# Patient Record
Sex: MALE | Race: OTHER
[De-identification: names, ages, dates, MRNs, and addresses within clinical notes are randomized per-mention and may not be internally consistent; named-entity substitution may affect disease eponyms.]

---

## 2020-07-28 ENCOUNTER — HOSPITAL ENCOUNTER (INPATIENT)
Dept: HOSPITAL 15 - ER | Age: 60
LOS: 8 days | Discharge: HOME | DRG: 174 | End: 2020-08-05
Attending: INTERNAL MEDICINE | Admitting: NURSE PRACTITIONER
Payer: COMMERCIAL

## 2020-07-28 VITALS — WEIGHT: 200.84 LBS | HEIGHT: 73 IN | BODY MASS INDEX: 26.62 KG/M2

## 2020-07-28 VITALS — SYSTOLIC BLOOD PRESSURE: 142 MMHG | DIASTOLIC BLOOD PRESSURE: 92 MMHG

## 2020-07-28 DIAGNOSIS — Z82.3: ICD-10-CM

## 2020-07-28 DIAGNOSIS — E43: ICD-10-CM

## 2020-07-28 DIAGNOSIS — Z79.02: ICD-10-CM

## 2020-07-28 DIAGNOSIS — I16.0: ICD-10-CM

## 2020-07-28 DIAGNOSIS — Z20.828: ICD-10-CM

## 2020-07-28 DIAGNOSIS — G62.9: ICD-10-CM

## 2020-07-28 DIAGNOSIS — R18.8: ICD-10-CM

## 2020-07-28 DIAGNOSIS — Z91.19: ICD-10-CM

## 2020-07-28 DIAGNOSIS — I07.1: ICD-10-CM

## 2020-07-28 DIAGNOSIS — E87.5: ICD-10-CM

## 2020-07-28 DIAGNOSIS — Z79.899: ICD-10-CM

## 2020-07-28 DIAGNOSIS — I21.4: Primary | ICD-10-CM

## 2020-07-28 DIAGNOSIS — Z79.01: ICD-10-CM

## 2020-07-28 DIAGNOSIS — I63.9: ICD-10-CM

## 2020-07-28 DIAGNOSIS — I13.0: ICD-10-CM

## 2020-07-28 DIAGNOSIS — E03.9: ICD-10-CM

## 2020-07-28 DIAGNOSIS — N18.9: ICD-10-CM

## 2020-07-28 DIAGNOSIS — J45.909: ICD-10-CM

## 2020-07-28 DIAGNOSIS — Z95.5: ICD-10-CM

## 2020-07-28 DIAGNOSIS — I42.0: ICD-10-CM

## 2020-07-28 DIAGNOSIS — N17.0: ICD-10-CM

## 2020-07-28 DIAGNOSIS — F17.210: ICD-10-CM

## 2020-07-28 DIAGNOSIS — I50.43: ICD-10-CM

## 2020-07-28 DIAGNOSIS — D68.9: ICD-10-CM

## 2020-07-28 DIAGNOSIS — R47.81: ICD-10-CM

## 2020-07-28 DIAGNOSIS — E78.5: ICD-10-CM

## 2020-07-28 DIAGNOSIS — I25.5: ICD-10-CM

## 2020-07-28 DIAGNOSIS — J98.11: ICD-10-CM

## 2020-07-28 LAB
ALBUMIN SERPL-MCNC: 2.7 G/DL (ref 3.4–5)
ALP SERPL-CCNC: 98 U/L (ref 45–117)
ALT SERPL-CCNC: 28 U/L (ref 16–61)
ANION GAP SERPL CALCULATED.3IONS-SCNC: 8 MMOL/L (ref 5–15)
APTT PPP: 27.1 SEC (ref 23.64–32.05)
BILIRUB SERPL-MCNC: 1.1 MG/DL (ref 0.2–1)
BUN SERPL-MCNC: 26 MG/DL (ref 7–18)
BUN/CREAT SERPL: 22.4
CALCIUM SERPL-MCNC: 8.2 MG/DL (ref 8.5–10.1)
CHLORIDE SERPL-SCNC: 109 MMOL/L (ref 98–107)
CO2 SERPL-SCNC: 23 MMOL/L (ref 21–32)
GLUCOSE SERPL-MCNC: 95 MG/DL (ref 74–106)
HCT VFR BLD AUTO: 44 % (ref 41–53)
HGB BLD-MCNC: 13.9 G/DL (ref 13.5–17.5)
INR PPP: 1.34 (ref 0.9–1.15)
MAGNESIUM SERPL-MCNC: 2 MG/DL (ref 1.6–2.6)
MCH RBC QN AUTO: 26.4 PG (ref 28–32)
MCV RBC AUTO: 83.5 FL (ref 80–100)
NRBC BLD QL AUTO: 0 %
POTASSIUM SERPL-SCNC: 4.6 MMOL/L (ref 3.5–5.1)
PROT SERPL-MCNC: 6.5 G/DL (ref 6.4–8.2)
SODIUM SERPL-SCNC: 140 MMOL/L (ref 136–145)

## 2020-07-28 PROCEDURE — 96375 TX/PRO/DX INJ NEW DRUG ADDON: CPT

## 2020-07-28 PROCEDURE — 87040 BLOOD CULTURE FOR BACTERIA: CPT

## 2020-07-28 PROCEDURE — 96374 THER/PROPH/DIAG INJ IV PUSH: CPT

## 2020-07-28 PROCEDURE — 93458 L HRT ARTERY/VENTRICLE ANGIO: CPT

## 2020-07-28 PROCEDURE — 84439 ASSAY OF FREE THYROXINE: CPT

## 2020-07-28 PROCEDURE — 93970 EXTREMITY STUDY: CPT

## 2020-07-28 PROCEDURE — 70551 MRI BRAIN STEM W/O DYE: CPT

## 2020-07-28 PROCEDURE — 71045 X-RAY EXAM CHEST 1 VIEW: CPT

## 2020-07-28 PROCEDURE — 93017 CV STRESS TEST TRACING ONLY: CPT

## 2020-07-28 PROCEDURE — 97163 PT EVAL HIGH COMPLEX 45 MIN: CPT

## 2020-07-28 PROCEDURE — 93886 INTRACRANIAL COMPLETE STUDY: CPT

## 2020-07-28 PROCEDURE — 36415 COLL VENOUS BLD VENIPUNCTURE: CPT

## 2020-07-28 PROCEDURE — 81001 URINALYSIS AUTO W/SCOPE: CPT

## 2020-07-28 PROCEDURE — 84443 ASSAY THYROID STIM HORMONE: CPT

## 2020-07-28 PROCEDURE — 85025 COMPLETE CBC W/AUTO DIFF WBC: CPT

## 2020-07-28 PROCEDURE — 85610 PROTHROMBIN TIME: CPT

## 2020-07-28 PROCEDURE — 94640 AIRWAY INHALATION TREATMENT: CPT

## 2020-07-28 PROCEDURE — 83880 ASSAY OF NATRIURETIC PEPTIDE: CPT

## 2020-07-28 PROCEDURE — 78452 HT MUSCLE IMAGE SPECT MULT: CPT

## 2020-07-28 PROCEDURE — 80061 LIPID PANEL: CPT

## 2020-07-28 PROCEDURE — 80307 DRUG TEST PRSMV CHEM ANLYZR: CPT

## 2020-07-28 PROCEDURE — 80048 BASIC METABOLIC PNL TOTAL CA: CPT

## 2020-07-28 PROCEDURE — 99152 MOD SED SAME PHYS/QHP 5/>YRS: CPT

## 2020-07-28 PROCEDURE — 70450 CT HEAD/BRAIN W/O DYE: CPT

## 2020-07-28 PROCEDURE — 97530 THERAPEUTIC ACTIVITIES: CPT

## 2020-07-28 PROCEDURE — 80053 COMPREHEN METABOLIC PANEL: CPT

## 2020-07-28 PROCEDURE — 96372 THER/PROPH/DIAG INJ SC/IM: CPT

## 2020-07-28 PROCEDURE — 93306 TTE W/DOPPLER COMPLETE: CPT

## 2020-07-28 PROCEDURE — 99153 MOD SED SAME PHYS/QHP EA: CPT

## 2020-07-28 PROCEDURE — 93005 ELECTROCARDIOGRAM TRACING: CPT

## 2020-07-28 PROCEDURE — 84484 ASSAY OF TROPONIN QUANT: CPT

## 2020-07-28 PROCEDURE — 85379 FIBRIN DEGRADATION QUANT: CPT

## 2020-07-28 PROCEDURE — 83036 HEMOGLOBIN GLYCOSYLATED A1C: CPT

## 2020-07-28 PROCEDURE — 85730 THROMBOPLASTIN TIME PARTIAL: CPT

## 2020-07-28 PROCEDURE — 71275 CT ANGIOGRAPHY CHEST: CPT

## 2020-07-28 PROCEDURE — 92928 PRQ TCAT PLMT NTRAC ST 1 LES: CPT

## 2020-07-28 PROCEDURE — 83735 ASSAY OF MAGNESIUM: CPT

## 2020-07-28 RX ADMIN — ATORVASTATIN CALCIUM SCH MG: 20 TABLET, FILM COATED ORAL at 22:41

## 2020-07-28 RX ADMIN — FAMOTIDINE SCH MG: 20 TABLET, FILM COATED ORAL at 22:41

## 2020-07-28 NOTE — NUR
Opening noteTelemetry admit from [facility] admitted to Telemetry unit after SBAR received.  Patient oriented to 
PIEDAD RODRIGUEZ, RN primary RN, unit, room, bed, and unit policies regarding patient 
care and visiting hours. Patient now on continuous telemetry monitoring, tele box #7  and 
telemetry reading on arrival to unit is sinus tachy 104 HR. Patient placed on bedside 
oxygen, weighed by bedscale and encouraged to call if they need something. All questions and 
concerns addressed, patient verbalized understanding.

## 2020-07-29 VITALS — SYSTOLIC BLOOD PRESSURE: 143 MMHG | DIASTOLIC BLOOD PRESSURE: 92 MMHG

## 2020-07-29 VITALS — SYSTOLIC BLOOD PRESSURE: 111 MMHG | DIASTOLIC BLOOD PRESSURE: 70 MMHG

## 2020-07-29 VITALS — SYSTOLIC BLOOD PRESSURE: 97 MMHG | DIASTOLIC BLOOD PRESSURE: 59 MMHG

## 2020-07-29 VITALS — SYSTOLIC BLOOD PRESSURE: 143 MMHG | DIASTOLIC BLOOD PRESSURE: 90 MMHG

## 2020-07-29 VITALS — DIASTOLIC BLOOD PRESSURE: 90 MMHG | SYSTOLIC BLOOD PRESSURE: 136 MMHG

## 2020-07-29 LAB
ANION GAP SERPL CALCULATED.3IONS-SCNC: 8 MMOL/L (ref 5–15)
BUN SERPL-MCNC: 29 MG/DL (ref 7–18)
BUN/CREAT SERPL: 20.7
CALCIUM SERPL-MCNC: 8.5 MG/DL (ref 8.5–10.1)
CHLORIDE SERPL-SCNC: 102 MMOL/L (ref 98–107)
CO2 SERPL-SCNC: 27 MMOL/L (ref 21–32)
GLUCOSE SERPL-MCNC: 155 MG/DL (ref 74–106)
HCT VFR BLD AUTO: 48.9 % (ref 41–53)
HGB BLD-MCNC: 15.7 G/DL (ref 13.5–17.5)
MCH RBC QN AUTO: 27.2 PG (ref 28–32)
MCV RBC AUTO: 84.7 FL (ref 80–100)
NRBC BLD QL AUTO: 0.1 %
POTASSIUM SERPL-SCNC: 4.3 MMOL/L (ref 3.5–5.1)
SODIUM SERPL-SCNC: 137 MMOL/L (ref 136–145)

## 2020-07-29 RX ADMIN — FUROSEMIDE SCH MG: 10 INJECTION, SOLUTION INTRAMUSCULAR; INTRAVENOUS at 18:00

## 2020-07-29 RX ADMIN — FAMOTIDINE SCH MG: 20 TABLET, FILM COATED ORAL at 09:28

## 2020-07-29 RX ADMIN — FUROSEMIDE SCH MG: 10 INJECTION, SOLUTION INTRAMUSCULAR; INTRAVENOUS at 06:20

## 2020-07-29 RX ADMIN — ATORVASTATIN CALCIUM SCH MG: 20 TABLET, FILM COATED ORAL at 22:27

## 2020-07-29 RX ADMIN — METOPROLOL TARTRATE SCH MG: 50 TABLET, FILM COATED ORAL at 10:32

## 2020-07-29 RX ADMIN — SPIRONOLACTONE SCH MG: 25 TABLET, FILM COATED ORAL at 18:26

## 2020-07-29 RX ADMIN — FAMOTIDINE SCH MG: 20 TABLET, FILM COATED ORAL at 22:28

## 2020-07-29 RX ADMIN — METOPROLOL TARTRATE SCH MG: 50 TABLET, FILM COATED ORAL at 22:27

## 2020-07-29 RX ADMIN — Medication SCH ML: at 18:35

## 2020-07-29 RX ADMIN — ENOXAPARIN SODIUM SCH MG: 40 INJECTION SUBCUTANEOUS at 09:30

## 2020-07-29 NOTE — NUR
Closing note



Patient is covid negative, gave sbar report to Yu MAGANA, patient to be transferred to room 
221. Patient and all of his belongings were sent with him. No sob or distress noted, Patient 
verbalized understanding of transfer, no questions at this time.

## 2020-07-29 NOTE — NUR
Opening Shift Note

Assumed care of patient, awake and alert reclining on L side watching television. RN noted 
pt taking deep breaths and widening eyes as if in discomfort or apprehensive. Pt denied pain 
or worry stating, "I am just full... and I didn't even eat it all." No other S/S of 
distress/SOB or pain; pt requiring no O2.  Instructed on POC and to call for assist PRN; pt 
VU. RN will continue to monitor for changes Q1hr and PRN. Bed in low position with HOB in 
semi-Randle's position. Nurse call light within pt's reach.

## 2020-07-29 NOTE — NUR
R breath sounds more increased than L with occ scattered rales. L lower lobe clear and 
diminished with upper lobe clear ant. Pt slowly talking more. Initially confused as to 
location. Pt knew he was in the hospital, but thought it was down the hill. Easily 
reoriented. BS soft and active to rounded soft abd. BLEs edematous with 1-2+ pitting in L 
lower leg and both ankles tight with hyperpigmention bilat.

## 2020-07-29 NOTE — NUR
Received 60 y.o male pt from [facility] after receiving report from IZZY Richey. Pt awake 
and alert and oriented x4. Transfered to  221A via w/c. Camelia activity well. Immediately 
connected to O2 at 3.5 lpm via n/c. Pt able to transfer independently from w/c to bed. Bed 
in low position with HOB in [place]'s. Nurse call light attached to L HOB rail.

## 2020-07-30 VITALS — SYSTOLIC BLOOD PRESSURE: 125 MMHG | DIASTOLIC BLOOD PRESSURE: 75 MMHG

## 2020-07-30 VITALS — SYSTOLIC BLOOD PRESSURE: 124 MMHG | DIASTOLIC BLOOD PRESSURE: 73 MMHG

## 2020-07-30 VITALS — SYSTOLIC BLOOD PRESSURE: 108 MMHG | DIASTOLIC BLOOD PRESSURE: 83 MMHG

## 2020-07-30 VITALS — SYSTOLIC BLOOD PRESSURE: 139 MMHG | DIASTOLIC BLOOD PRESSURE: 94 MMHG

## 2020-07-30 VITALS — SYSTOLIC BLOOD PRESSURE: 138 MMHG | DIASTOLIC BLOOD PRESSURE: 89 MMHG

## 2020-07-30 LAB
ANION GAP SERPL CALCULATED.3IONS-SCNC: 6 MMOL/L (ref 5–15)
BUN SERPL-MCNC: 41 MG/DL (ref 7–18)
BUN/CREAT SERPL: 31.1
CALCIUM SERPL-MCNC: 8.4 MG/DL (ref 8.5–10.1)
CHLORIDE SERPL-SCNC: 105 MMOL/L (ref 98–107)
CO2 SERPL-SCNC: 25 MMOL/L (ref 21–32)
GLUCOSE SERPL-MCNC: 122 MG/DL (ref 74–106)
HCT VFR BLD AUTO: 47.4 % (ref 41–53)
HGB BLD-MCNC: 14.9 G/DL (ref 13.5–17.5)
MCH RBC QN AUTO: 26.7 PG (ref 28–32)
MCV RBC AUTO: 84.8 FL (ref 80–100)
NRBC BLD QL AUTO: 0.1 %
POTASSIUM SERPL-SCNC: 4.8 MMOL/L (ref 3.5–5.1)
SODIUM SERPL-SCNC: 136 MMOL/L (ref 136–145)

## 2020-07-30 RX ADMIN — METOPROLOL TARTRATE SCH MG: 50 TABLET, FILM COATED ORAL at 10:00

## 2020-07-30 RX ADMIN — SPIRONOLACTONE SCH MG: 25 TABLET, FILM COATED ORAL at 18:30

## 2020-07-30 RX ADMIN — FUROSEMIDE SCH MG: 10 INJECTION, SOLUTION INTRAMUSCULAR; INTRAVENOUS at 05:25

## 2020-07-30 RX ADMIN — Medication SCH ML: at 18:30

## 2020-07-30 RX ADMIN — METOPROLOL TARTRATE SCH MG: 50 TABLET, FILM COATED ORAL at 22:19

## 2020-07-30 RX ADMIN — Medication SCH ML: at 08:00

## 2020-07-30 RX ADMIN — ENOXAPARIN SODIUM SCH MG: 40 INJECTION SUBCUTANEOUS at 10:00

## 2020-07-30 RX ADMIN — ATORVASTATIN CALCIUM SCH MG: 20 TABLET, FILM COATED ORAL at 22:20

## 2020-07-30 RX ADMIN — FAMOTIDINE SCH MG: 20 TABLET, FILM COATED ORAL at 22:19

## 2020-07-30 RX ADMIN — FAMOTIDINE SCH MG: 20 TABLET, FILM COATED ORAL at 10:00

## 2020-07-30 RX ADMIN — FUROSEMIDE SCH MG: 10 INJECTION, SOLUTION INTRAMUSCULAR; INTRAVENOUS at 18:20

## 2020-07-30 RX ADMIN — Medication SCH ML: at 12:00

## 2020-07-30 RX ADMIN — SPIRONOLACTONE SCH MG: 25 TABLET, FILM COATED ORAL at 05:25

## 2020-07-30 NOTE — NUR
Called to pt's room by IZZY Farr who stated pt's call light went on and he entered room to 
find pt unable to answer questions. Pt not answering questions for this RN or for fellow RN. 
Unable to state his name or anything else, grunts then resumes sleep. Color dusky. O2 sat 
84; hands cold. Notified IZZY Joe,CN who responded to room as well as other RNs and CNAs. 
. Pt mouth breathing heavily; O2 sat remaining in mid 80s. RT paged to room. Heat 
packs given to pt to hold. This RN had covered pt prev with two blankets neither of which 
were on pt at this time. Pt voided in bed. HOB raised, pt beginning to rouse more with O2 
placed between lips. Raymundo Ely, in to room. This RN supplying staff with pt's hx and 
current dx and meds. Team observed pt who began to smile and able to answer questions with 
increasing thoroughness. RT stated he will look in on pt freq for remainder of shift. JENNIFER and 
House Sup left room. Pt has no drugs among his belonging in his bedside drawers.

## 2020-07-30 NOTE — NUR
Pt walking out of room into hallway; guided back to his room by hospital personnel. This RN 
responded to pt's room asking him where he was going. Pt replied that he was looking for / 
waiting on his grandfather. Informed pt that he is in [facility] and his 
grandfather is not here. Pt surprised. RN gave standby assist at pt returned to his bed; RN 
applied O2 via n/c. O2 sat 86. O2 at 3.5  lpm. Pt dropping off to sleep. /62 HR 88, RR 
16. O2 sat increased after several minutes to 96 as long as pt breathing through nose. RN 
reminding pt twice to do this. Pt reclined in bed on R side and fell asleep. Low light in 
room and BR to help orient pt upon his awakening.

## 2020-07-30 NOTE — NUR
Opening Shift Note

Assuming care of patient at this time. Patient is resting in bed with eyes closed. Patient 
shows no signs or symptoms of distress or shortness of breath. Bed is locked and lowered 
with side rails up x2. Will continue to round hourly and as needed.

## 2020-07-30 NOTE — NUR
22g cath used to start second saline lock on first attempt at R post. forearm. Pt bill. well. 
 After lock inserted and taped, pt having episode of gasping for breaths using mouth totally 
to breathe. This RN coaching him to close his mouth and breathe through his nose. Pt instead 
pushed n/c up against nose and brought breathing into calmer slower state then resumed 
sleep. Will endorse to day RN.

## 2020-07-31 VITALS — SYSTOLIC BLOOD PRESSURE: 113 MMHG | DIASTOLIC BLOOD PRESSURE: 72 MMHG

## 2020-07-31 VITALS — SYSTOLIC BLOOD PRESSURE: 119 MMHG | DIASTOLIC BLOOD PRESSURE: 80 MMHG

## 2020-07-31 VITALS — SYSTOLIC BLOOD PRESSURE: 109 MMHG | DIASTOLIC BLOOD PRESSURE: 71 MMHG

## 2020-07-31 VITALS — SYSTOLIC BLOOD PRESSURE: 124 MMHG | DIASTOLIC BLOOD PRESSURE: 78 MMHG

## 2020-07-31 VITALS — SYSTOLIC BLOOD PRESSURE: 142 MMHG | DIASTOLIC BLOOD PRESSURE: 100 MMHG

## 2020-07-31 LAB
ANION GAP SERPL CALCULATED.3IONS-SCNC: 4 MMOL/L (ref 5–15)
BUN SERPL-MCNC: 40 MG/DL (ref 7–18)
BUN/CREAT SERPL: 31.5
CALCIUM SERPL-MCNC: 8.3 MG/DL (ref 8.5–10.1)
CHLORIDE SERPL-SCNC: 104 MMOL/L (ref 98–107)
CO2 SERPL-SCNC: 31 MMOL/L (ref 21–32)
GLUCOSE SERPL-MCNC: 120 MG/DL (ref 74–106)
HCT VFR BLD AUTO: 47.3 % (ref 41–53)
HGB BLD-MCNC: 15 G/DL (ref 13.5–17.5)
MCH RBC QN AUTO: 26.7 PG (ref 28–32)
MCV RBC AUTO: 84.4 FL (ref 80–100)
NRBC BLD QL AUTO: 0.1 %
POTASSIUM SERPL-SCNC: 4.5 MMOL/L (ref 3.5–5.1)
SODIUM SERPL-SCNC: 139 MMOL/L (ref 136–145)

## 2020-07-31 RX ADMIN — FAMOTIDINE SCH MG: 20 TABLET, FILM COATED ORAL at 10:00

## 2020-07-31 RX ADMIN — FUROSEMIDE SCH MG: 10 INJECTION, SOLUTION INTRAMUSCULAR; INTRAVENOUS at 18:07

## 2020-07-31 RX ADMIN — FAMOTIDINE SCH MG: 20 TABLET, FILM COATED ORAL at 21:32

## 2020-07-31 RX ADMIN — SPIRONOLACTONE SCH MG: 25 TABLET, FILM COATED ORAL at 18:08

## 2020-07-31 RX ADMIN — ENOXAPARIN SODIUM SCH MG: 40 INJECTION SUBCUTANEOUS at 10:00

## 2020-07-31 RX ADMIN — ATORVASTATIN CALCIUM SCH MG: 20 TABLET, FILM COATED ORAL at 21:33

## 2020-07-31 RX ADMIN — METOPROLOL TARTRATE SCH MG: 50 TABLET, FILM COATED ORAL at 11:11

## 2020-07-31 RX ADMIN — SPIRONOLACTONE SCH MG: 25 TABLET, FILM COATED ORAL at 05:46

## 2020-07-31 RX ADMIN — METOPROLOL TARTRATE SCH MG: 50 TABLET, FILM COATED ORAL at 21:36

## 2020-07-31 RX ADMIN — Medication SCH ML: at 18:09

## 2020-07-31 RX ADMIN — APIXABAN SCH MG: 2.5 TABLET, FILM COATED ORAL at 21:32

## 2020-07-31 RX ADMIN — FUROSEMIDE SCH MG: 10 INJECTION, SOLUTION INTRAMUSCULAR; INTRAVENOUS at 05:33

## 2020-07-31 RX ADMIN — Medication SCH ML: at 08:00

## 2020-07-31 NOTE — NUR
Communicated to dr garland of clarification on new order of Eliquis 2.5 mg and MRI results of 
stroke, if its ok to give eliquis. MD Garland advised to continue with order of Eliquis. 
Confirmed with MD Garland its ok to give. 



Also spoke to [facility] if its ok to give Eliquis and notified her of MRI results. 
[facility] said it was ok to give Eliquis. 



will carry out.

## 2020-08-01 VITALS — SYSTOLIC BLOOD PRESSURE: 125 MMHG | DIASTOLIC BLOOD PRESSURE: 68 MMHG

## 2020-08-01 VITALS — DIASTOLIC BLOOD PRESSURE: 76 MMHG | SYSTOLIC BLOOD PRESSURE: 119 MMHG

## 2020-08-01 VITALS — SYSTOLIC BLOOD PRESSURE: 123 MMHG | DIASTOLIC BLOOD PRESSURE: 81 MMHG

## 2020-08-01 VITALS — DIASTOLIC BLOOD PRESSURE: 86 MMHG | SYSTOLIC BLOOD PRESSURE: 121 MMHG

## 2020-08-01 VITALS — SYSTOLIC BLOOD PRESSURE: 112 MMHG | DIASTOLIC BLOOD PRESSURE: 64 MMHG

## 2020-08-01 LAB
ALCOHOL, URINE: < 3 MG/DL (ref 0–10)
AMPHETAMINES UR QL SCN: NEGATIVE
BARBITURATES UR QL SCN: NEGATIVE
BENZODIAZ UR QL SCN: NEGATIVE
BZE UR QL SCN: NEGATIVE
CANNABINOIDS UR QL SCN: NEGATIVE
CHOLEST SERPL-MCNC: 83 MG/DL (ref ?–200)
HDLC SERPL-MCNC: 30 MG/DL (ref 40–59)
OPIATES UR QL SCN: NEGATIVE
PCP UR QL SCN: NEGATIVE
TRIGL SERPL-MCNC: 86 MG/DL (ref ?–150)

## 2020-08-01 RX ADMIN — SPIRONOLACTONE SCH MG: 25 TABLET, FILM COATED ORAL at 18:00

## 2020-08-01 RX ADMIN — APIXABAN SCH MG: 2.5 TABLET, FILM COATED ORAL at 10:25

## 2020-08-01 RX ADMIN — FAMOTIDINE SCH MG: 20 TABLET, FILM COATED ORAL at 21:41

## 2020-08-01 RX ADMIN — FUROSEMIDE SCH MG: 10 INJECTION, SOLUTION INTRAMUSCULAR; INTRAVENOUS at 18:00

## 2020-08-01 RX ADMIN — FAMOTIDINE SCH MG: 20 TABLET, FILM COATED ORAL at 10:26

## 2020-08-01 RX ADMIN — Medication SCH ML: at 08:00

## 2020-08-01 RX ADMIN — METOPROLOL TARTRATE SCH MG: 50 TABLET, FILM COATED ORAL at 10:26

## 2020-08-01 RX ADMIN — LEVOTHYROXINE SODIUM SCH MCG: 25 TABLET ORAL at 06:23

## 2020-08-01 RX ADMIN — ATORVASTATIN CALCIUM SCH MG: 20 TABLET, FILM COATED ORAL at 21:41

## 2020-08-01 RX ADMIN — SPIRONOLACTONE SCH MG: 25 TABLET, FILM COATED ORAL at 05:59

## 2020-08-01 RX ADMIN — Medication SCH ML: at 18:14

## 2020-08-01 RX ADMIN — FUROSEMIDE SCH MG: 10 INJECTION, SOLUTION INTRAMUSCULAR; INTRAVENOUS at 05:59

## 2020-08-01 RX ADMIN — APIXABAN SCH MG: 2.5 TABLET, FILM COATED ORAL at 21:39

## 2020-08-01 RX ADMIN — METOPROLOL TARTRATE SCH MG: 50 TABLET, FILM COATED ORAL at 21:41

## 2020-08-01 NOTE — NUR
Called On Call SS

Spoke with Joaquina regarding patient's social service consult. Per Joaquina, Hans with be 
speaking with patient regarding insurance status.

## 2020-08-01 NOTE — NUR
Nutrition Assessment Notes



Please refer to link for full assessment notes.



Est Energy needs: 6849-9378 kcals (23-25 kcal/kgBW)

Est Protein needs: 114-140 gms/day (1.12-1.37 gm/kgBW)



Will continue to monitor and reassess prn.

-------------------------------------------------------------------------------

Addendum: 08/01/20 at 1558 by Selena Lynch RD

-------------------------------------------------------------------------------

Amended: Links added.

## 2020-08-01 NOTE — NUR
Opening Shift Note

Assumed patient care from NOC RN. 

Patient currently laying on left side, no signs of distress. Respirations even and 
unlabored, patient denies shortness of breath at this time. Patient encouraged to use nasal 
canula, verbalized understanding but is refusing at this time. Safety precautions in place. 
Bed is locked and in lowest position, call light within reach. Will continue to monitor.

-------------------------------------------------------------------------------

Addendum: 08/02/20 at 1530 by FABIO WYNN RN RN

-------------------------------------------------------------------------------

Patient's speech is clear/understandable although some slurring is noted (since 7/30 NOC)

## 2020-08-01 NOTE — NUR
Refused Meds

Patient refusing medications at this time. States he wants to speak with a doctor before 
continuing with treatments. Patient is AOx4, arousable to name and able to follow 
directions. +4 strength of all extremities. Safety precautions in place, patient reoriented 
to call light and encouraged to call as needed. Will continue to monitor.

## 2020-08-01 NOTE — NUR
Patient rounds

Patient currently laying supine in bed, denies pain and shortness of breath at this time. 
Patient is AOx4 and restless, but states he does not need anything at this time. Patient 
reminded on and educated about need for urine sample, patient verbalized understanding. 
Safety precautions in place, respirations even and unlabored, will continue to monitor.

## 2020-08-01 NOTE — NUR
Family

Spoke with patient's family regarding plan of care. Patient states he does not remember what 
happened on 7/30, states MD has not discussed diagnosis with him. Family educated that MD 
will discuss patient's diagnosis with patient/family and that it is outside of RN scope of 
practice to diagnose. Family and patient aware and verbalized understanding, patient/family 
aware of neuro consult that was placed and patient's wife requesting to speak with 
neurologist. Will notify neurologist.

## 2020-08-02 VITALS — DIASTOLIC BLOOD PRESSURE: 79 MMHG | SYSTOLIC BLOOD PRESSURE: 111 MMHG

## 2020-08-02 VITALS — DIASTOLIC BLOOD PRESSURE: 75 MMHG | SYSTOLIC BLOOD PRESSURE: 113 MMHG

## 2020-08-02 VITALS — DIASTOLIC BLOOD PRESSURE: 76 MMHG | SYSTOLIC BLOOD PRESSURE: 103 MMHG

## 2020-08-02 VITALS — DIASTOLIC BLOOD PRESSURE: 84 MMHG | SYSTOLIC BLOOD PRESSURE: 118 MMHG

## 2020-08-02 LAB
ANION GAP SERPL CALCULATED.3IONS-SCNC: 3 MMOL/L (ref 5–15)
BUN SERPL-MCNC: 37 MG/DL (ref 7–18)
BUN/CREAT SERPL: 34.6
CALCIUM SERPL-MCNC: 8.5 MG/DL (ref 8.5–10.1)
CHLORIDE SERPL-SCNC: 105 MMOL/L (ref 98–107)
CO2 SERPL-SCNC: 31 MMOL/L (ref 21–32)
GLUCOSE SERPL-MCNC: 100 MG/DL (ref 74–106)
POTASSIUM SERPL-SCNC: 4.4 MMOL/L (ref 3.5–5.1)
SODIUM SERPL-SCNC: 139 MMOL/L (ref 136–145)

## 2020-08-02 RX ADMIN — ENALAPRIL MALEATE SCH MG: 2.5 TABLET ORAL at 22:17

## 2020-08-02 RX ADMIN — Medication SCH ML: at 17:54

## 2020-08-02 RX ADMIN — SPIRONOLACTONE SCH MG: 25 TABLET, FILM COATED ORAL at 06:17

## 2020-08-02 RX ADMIN — Medication SCH ML: at 09:52

## 2020-08-02 RX ADMIN — FAMOTIDINE SCH MG: 20 TABLET, FILM COATED ORAL at 22:16

## 2020-08-02 RX ADMIN — METOPROLOL TARTRATE SCH MG: 50 TABLET, FILM COATED ORAL at 22:16

## 2020-08-02 RX ADMIN — APIXABAN SCH MG: 2.5 TABLET, FILM COATED ORAL at 09:51

## 2020-08-02 RX ADMIN — APIXABAN SCH MG: 2.5 TABLET, FILM COATED ORAL at 22:15

## 2020-08-02 RX ADMIN — SPIRONOLACTONE SCH MG: 25 TABLET, FILM COATED ORAL at 17:54

## 2020-08-02 RX ADMIN — LEVOTHYROXINE SODIUM SCH MCG: 25 TABLET ORAL at 06:17

## 2020-08-02 RX ADMIN — ATORVASTATIN CALCIUM SCH MG: 20 TABLET, FILM COATED ORAL at 22:15

## 2020-08-02 RX ADMIN — FAMOTIDINE SCH MG: 20 TABLET, FILM COATED ORAL at 09:51

## 2020-08-02 RX ADMIN — FUROSEMIDE SCH MG: 10 INJECTION, SOLUTION INTRAMUSCULAR; INTRAVENOUS at 06:17

## 2020-08-02 RX ADMIN — METOPROLOL TARTRATE SCH MG: 50 TABLET, FILM COATED ORAL at 09:51

## 2020-08-02 NOTE — NUR
opening note



pt AxO to self, place, and situation.  confused to time.  respirations even and nonlabored 
on 2L nc.  pt denies pain or discomfort at this time, will continue to monitor.  pt is 
ambulatory.  POC discussed, pt verbalized understanding .  bed in low locked position, call 
light within reach.

## 2020-08-02 NOTE — NUR
MD at Bedside

Dr. Barrios at bedside discussing plan of care with patient. Dr. Barrios re-explained to patient 
that he had a stroke and that we are pending possible angiogram. Patient verbalized 
understanding. MD informed patient and this RN that she had spoke with his next of kin 
regarding patient status and plan of care. 

Patient shows no signs of distress at this time, respirations even and unlabored. Patient is 
AOx4, although speech continues to be slurred but clear/understandable. Will continue to 
monitor.

## 2020-08-02 NOTE — NUR
MD at Station

Spoke with Dr. Garland regarding possible left heart cath tomorrow. Per MD, no intervention 
until patient is stable, no plan for left heart cath tomorrow at this time.

## 2020-08-02 NOTE — NUR
Up with PT

Patient ambulated around station with physical therapist. No sign of distress at this time.

## 2020-08-02 NOTE — NUR
Opening Shift Note

Assumed patient care from [facility] RN, Nydia. 

Patient currently laying on right side. Answers questions appropriately. Patient is AOx4 but 
has periods of forgetfulness, RN asked patient if he remembered speaking with Dr. Melendez, 
patient states he remembers speaking with MD but states he does not recall content of 
conversation. Patient call light within reach, patient encouraged to call PRN. Safety 
precautions in place, will continue to monitor.

## 2020-08-03 VITALS — SYSTOLIC BLOOD PRESSURE: 121 MMHG | DIASTOLIC BLOOD PRESSURE: 73 MMHG

## 2020-08-03 VITALS — SYSTOLIC BLOOD PRESSURE: 127 MMHG | DIASTOLIC BLOOD PRESSURE: 69 MMHG

## 2020-08-03 VITALS — SYSTOLIC BLOOD PRESSURE: 113 MMHG | DIASTOLIC BLOOD PRESSURE: 71 MMHG

## 2020-08-03 VITALS — SYSTOLIC BLOOD PRESSURE: 116 MMHG | DIASTOLIC BLOOD PRESSURE: 78 MMHG

## 2020-08-03 VITALS — SYSTOLIC BLOOD PRESSURE: 99 MMHG | DIASTOLIC BLOOD PRESSURE: 64 MMHG

## 2020-08-03 RX ADMIN — ENALAPRIL MALEATE SCH MG: 2.5 TABLET ORAL at 22:00

## 2020-08-03 RX ADMIN — FAMOTIDINE SCH MG: 20 TABLET, FILM COATED ORAL at 22:00

## 2020-08-03 RX ADMIN — APIXABAN SCH MG: 2.5 TABLET, FILM COATED ORAL at 21:59

## 2020-08-03 RX ADMIN — FUROSEMIDE SCH MG: 10 INJECTION, SOLUTION INTRAMUSCULAR; INTRAVENOUS at 09:58

## 2020-08-03 RX ADMIN — ATORVASTATIN CALCIUM SCH MG: 20 TABLET, FILM COATED ORAL at 21:59

## 2020-08-03 RX ADMIN — SPIRONOLACTONE SCH MG: 25 TABLET, FILM COATED ORAL at 17:50

## 2020-08-03 RX ADMIN — Medication SCH ML: at 17:51

## 2020-08-03 RX ADMIN — FAMOTIDINE SCH MG: 20 TABLET, FILM COATED ORAL at 09:59

## 2020-08-03 RX ADMIN — METOPROLOL TARTRATE SCH MG: 50 TABLET, FILM COATED ORAL at 21:59

## 2020-08-03 RX ADMIN — LEVOTHYROXINE SODIUM SCH MCG: 25 TABLET ORAL at 05:59

## 2020-08-03 RX ADMIN — Medication SCH ML: at 11:33

## 2020-08-03 RX ADMIN — METOPROLOL TARTRATE SCH MG: 50 TABLET, FILM COATED ORAL at 09:59

## 2020-08-03 RX ADMIN — ENALAPRIL MALEATE SCH MG: 2.5 TABLET ORAL at 09:59

## 2020-08-03 RX ADMIN — APIXABAN SCH MG: 2.5 TABLET, FILM COATED ORAL at 09:59

## 2020-08-03 RX ADMIN — SPIRONOLACTONE SCH MG: 25 TABLET, FILM COATED ORAL at 05:58

## 2020-08-03 NOTE — NUR
closing note



pt resting in left lateral position with eyes closed.  respirations even and nonlabored on 
2Lnc.  no s/s of pain or discomfort.  endorsed care to day shift RN Shy.  bed in low 
locked position, call light within reach.

## 2020-08-03 NOTE — NUR
Assumed patient care

Patient currently resting with eyes closed, laying on left side. Respirations even and 
unlabored, no signs of distress or pain at this time. Safety precautions in place. 
Instructed on POC and to call for assist PRN, will continue to monitor.

## 2020-08-03 NOTE — NUR
Opening Shift Note

Assumed patient care from NOC RN.

Patient currently resting with eyes closed, laying on right side. Respirations even and 
unlabored, no signs of distress at this time. Safety precautions in place. Will continue to 
monitor.

## 2020-08-03 NOTE — NUR
Informed Consent

Patient unable to sign informed consent at this time; requesting to speak with Dr. Garland 
regarding procedure/questions. 

Patient AOx4, no signs of distress at this time, respirations even and unlabored. Will 
continue to monitor.

## 2020-08-03 NOTE — NUR
Attempted PT treatment, pt requested PM treatment due to fatigue. Will attempt again this 
afternoon.

## 2020-08-03 NOTE — NUR
assessment  re: ss consult no insurance

Patient is a 60 year old male who is alert and oriented. Prior to admission patient lived 
home with friends and functioned independently. Patient informed me he is able to care for 
his own ADLs.  Per patient he will return home to his prior living arrangements post 
discharge and will have transport home. Patient has no insurance. Patient has been assessed 
by Hans Ernst of [facility]. Per Hans patients Medi-kwadwo is pending. I have provided 
patient with resources for [facility], Dr. Russell, and [facility] urgent care for follow up 
visits. I have provided patient with a prescription card from community assistance program. 
I informed patient his post discharge needs to be determined prior to discharge. I informed 
patient he has a right to speak to a  regarding all care. I informed patient he 
has a right to participate in any and all discharge planning.  Patient does not have a POA 
and advanced directive. I have offered patient information on POA and advanced directives. I 
informed the patient the advantages and benefits of having an Advanced Directive. Patient 
verbalized understanding and agreed to discharge plan.

-------------------------------------------------------------------------------

Addendum: 08/03/20 at 1430 by Joaquina SEPULVEDA

-------------------------------------------------------------------------------

Amended: Links added.

## 2020-08-04 VITALS — SYSTOLIC BLOOD PRESSURE: 119 MMHG | DIASTOLIC BLOOD PRESSURE: 78 MMHG

## 2020-08-04 VITALS — DIASTOLIC BLOOD PRESSURE: 95 MMHG | SYSTOLIC BLOOD PRESSURE: 139 MMHG

## 2020-08-04 VITALS — DIASTOLIC BLOOD PRESSURE: 88 MMHG | SYSTOLIC BLOOD PRESSURE: 126 MMHG

## 2020-08-04 VITALS — DIASTOLIC BLOOD PRESSURE: 77 MMHG | SYSTOLIC BLOOD PRESSURE: 110 MMHG

## 2020-08-04 VITALS — SYSTOLIC BLOOD PRESSURE: 95 MMHG | DIASTOLIC BLOOD PRESSURE: 66 MMHG

## 2020-08-04 LAB
ANION GAP SERPL CALCULATED.3IONS-SCNC: 0 MMOL/L (ref 5–15)
BUN SERPL-MCNC: 36 MG/DL (ref 7–18)
BUN/CREAT SERPL: 27.9
CALCIUM SERPL-MCNC: 8.5 MG/DL (ref 8.5–10.1)
CHLORIDE SERPL-SCNC: 101 MMOL/L (ref 98–107)
CO2 SERPL-SCNC: 36 MMOL/L (ref 21–32)
GLUCOSE SERPL-MCNC: 91 MG/DL (ref 74–106)
HCT VFR BLD AUTO: 45.1 % (ref 41–53)
HGB BLD-MCNC: 14.9 G/DL (ref 13.5–17.5)
INR PPP: 1.21 (ref 0.9–1.15)
MCH RBC QN AUTO: 27.3 PG (ref 28–32)
MCV RBC AUTO: 82.8 FL (ref 80–100)
NRBC BLD QL AUTO: 0.2 %
POTASSIUM SERPL-SCNC: 5.2 MMOL/L (ref 3.5–5.1)
SODIUM SERPL-SCNC: 137 MMOL/L (ref 136–145)

## 2020-08-04 PROCEDURE — B211YZZ FLUOROSCOPY OF MULTIPLE CORONARY ARTERIES USING OTHER CONTRAST: ICD-10-PCS | Performed by: INTERNAL MEDICINE

## 2020-08-04 PROCEDURE — 02703DZ DILATION OF CORONARY ARTERY, ONE ARTERY WITH INTRALUMINAL DEVICE, PERCUTANEOUS APPROACH: ICD-10-PCS | Performed by: INTERNAL MEDICINE

## 2020-08-04 PROCEDURE — 4A023N7 MEASUREMENT OF CARDIAC SAMPLING AND PRESSURE, LEFT HEART, PERCUTANEOUS APPROACH: ICD-10-PCS | Performed by: INTERNAL MEDICINE

## 2020-08-04 PROCEDURE — B215YZZ FLUOROSCOPY OF LEFT HEART USING OTHER CONTRAST: ICD-10-PCS | Performed by: INTERNAL MEDICINE

## 2020-08-04 RX ADMIN — Medication SCH ML: at 08:00

## 2020-08-04 RX ADMIN — APIXABAN SCH MG: 2.5 TABLET, FILM COATED ORAL at 09:33

## 2020-08-04 RX ADMIN — Medication SCH ML: at 18:03

## 2020-08-04 RX ADMIN — ATORVASTATIN CALCIUM SCH MG: 20 TABLET, FILM COATED ORAL at 22:45

## 2020-08-04 RX ADMIN — SPIRONOLACTONE SCH MG: 25 TABLET, FILM COATED ORAL at 05:58

## 2020-08-04 RX ADMIN — APIXABAN SCH MG: 2.5 TABLET, FILM COATED ORAL at 22:45

## 2020-08-04 RX ADMIN — METOPROLOL TARTRATE SCH MG: 50 TABLET, FILM COATED ORAL at 22:00

## 2020-08-04 RX ADMIN — FUROSEMIDE SCH MG: 10 INJECTION, SOLUTION INTRAMUSCULAR; INTRAVENOUS at 09:42

## 2020-08-04 RX ADMIN — METOPROLOL TARTRATE SCH MG: 50 TABLET, FILM COATED ORAL at 10:00

## 2020-08-04 RX ADMIN — FAMOTIDINE SCH MG: 20 TABLET, FILM COATED ORAL at 09:43

## 2020-08-04 RX ADMIN — FAMOTIDINE SCH MG: 20 TABLET, FILM COATED ORAL at 22:45

## 2020-08-04 RX ADMIN — LEVOTHYROXINE SODIUM SCH MCG: 25 TABLET ORAL at 05:59

## 2020-08-04 NOTE — NUR
Opening Note

Assumed care of patient, awake and alert, patient denies distress/SOB or pain. O2 at 2LPM 
via nasal canula. Instructed on POC and to call for assist PRN, will continue to monitor.

## 2020-08-04 NOTE — NUR
Nutrition Followup Note 



Wt 101.4kg



Pt was sleeping with no family at bedside at time of rounds.  Pt with NPO diet order for 
LHC, pt with fair po intake prior to NPO diet order with 61% avg po intake x 2 days per RN 
note. Will monitor pt po intake following LHC.  



Please refer to link for full assessment notes.



Est Energy needs: 3646-8755 kcals (23-25 kcal/kgBW)

Est Protein needs: 114-140 gms/day (1.12-1.37 gm/kgBW)



Will continue to monitor and reassess prn.



Labs:  BUN 36H, Alb 2.2L



BM:  pt with 1 Bm 7/31 per RN note



Skin:  Bs 18 mod risk, full details in RN wound care note



PES:  1) Overweight r/t energy intake in excess of energy needs aeb 122% IBW and BMI of 29.7 
kg/m2

2) Altered nutrition related lab values r/t current medical condition aeb hyperglycemia, 
hypocalcemia, mod hypoalbuminemia



Comments :Will continue to monitor PO status, skin status, pertinent labs and weight trends. 
Will f/u in 3-5 days.



1) Continue to closely monitor pt PO intake to meet at least 75% of meals 2) Refer pt to OP 
RD for nutrition/wt management education upon D/C 3) Continue current plan of care



Expected Outcomes/Goals: 

Pt to see a RD for nutrition/wt management nutrition education after D/C

Pt appetite to improve

Pt labs to improve

## 2020-08-05 VITALS — SYSTOLIC BLOOD PRESSURE: 113 MMHG | DIASTOLIC BLOOD PRESSURE: 65 MMHG

## 2020-08-05 VITALS — DIASTOLIC BLOOD PRESSURE: 62 MMHG | SYSTOLIC BLOOD PRESSURE: 116 MMHG

## 2020-08-05 VITALS — DIASTOLIC BLOOD PRESSURE: 89 MMHG | SYSTOLIC BLOOD PRESSURE: 123 MMHG

## 2020-08-05 VITALS — DIASTOLIC BLOOD PRESSURE: 65 MMHG | SYSTOLIC BLOOD PRESSURE: 113 MMHG

## 2020-08-05 LAB
ANION GAP SERPL CALCULATED.3IONS-SCNC: 5 MMOL/L (ref 5–15)
BUN SERPL-MCNC: 31 MG/DL (ref 7–18)
BUN/CREAT SERPL: 23.8
CALCIUM SERPL-MCNC: 8.2 MG/DL (ref 8.5–10.1)
CHLORIDE SERPL-SCNC: 100 MMOL/L (ref 98–107)
CO2 SERPL-SCNC: 30 MMOL/L (ref 21–32)
GLUCOSE SERPL-MCNC: 154 MG/DL (ref 74–106)
HCT VFR BLD AUTO: 46.3 % (ref 41–53)
HGB BLD-MCNC: 14.6 G/DL (ref 13.5–17.5)
MCH RBC QN AUTO: 26.4 PG (ref 28–32)
MCV RBC AUTO: 83.4 FL (ref 80–100)
NRBC BLD QL AUTO: 0.1 %
POTASSIUM SERPL-SCNC: 4.4 MMOL/L (ref 3.5–5.1)
SODIUM SERPL-SCNC: 135 MMOL/L (ref 136–145)

## 2020-08-05 RX ADMIN — APIXABAN SCH MG: 2.5 TABLET, FILM COATED ORAL at 10:16

## 2020-08-05 RX ADMIN — METOPROLOL TARTRATE SCH MG: 50 TABLET, FILM COATED ORAL at 10:15

## 2020-08-05 RX ADMIN — LEVOTHYROXINE SODIUM SCH MCG: 25 TABLET ORAL at 05:16

## 2020-08-05 RX ADMIN — FAMOTIDINE SCH MG: 20 TABLET, FILM COATED ORAL at 10:16

## 2020-08-05 RX ADMIN — Medication SCH ML: at 10:15

## 2020-08-05 RX ADMIN — FUROSEMIDE SCH MG: 10 INJECTION, SOLUTION INTRAMUSCULAR; INTRAVENOUS at 10:15

## 2020-08-05 NOTE — NUR
DISCHARGE INSTRUCTIONS PROVIDED TO PT. PT VERBALIZED UNDERSTANDING FOR PRESCRIPTION ORDERS 
AND FOLLOW UP APPOINTMENT. PT EDUCATED ON PRESCRIPTION SCHEDULING. EDUCATIONAL MATERIAL 
PROVIDED, ALL QUESTIONS AND CONCERNS ADDRESSED. 

PT VERBALIZED UNDERSTANDING FOR FOLLOW UP WITH CARDIOLOGIST AND NEUROLOGIST.

IV CATHETER DC TO LAC#20, RW#22, CATHETER INTACT, NO PHLEBITIS, 

TELE BOX REMOVED AND RETURNED TO TELE DEPT. 

PT AWAITING TRANSPORT.

## 2020-08-05 NOTE — NUR
PATIENT REPORTS TRANSPORT WILL NOT ARRIVE.

CALLED BERNA (NEXT OF [facility]) BERNA STATES SHE WILL NOT BE ABLE TO PROVIDE TRANSPORT. 

[employer] V[employer]ER PROVIDED FOR TRANSPORT. 

CHARGE NURSE MADE AWARE.